# Patient Record
Sex: FEMALE | Race: OTHER | URBAN - METROPOLITAN AREA
[De-identification: names, ages, dates, MRNs, and addresses within clinical notes are randomized per-mention and may not be internally consistent; named-entity substitution may affect disease eponyms.]

---

## 2021-03-12 ENCOUNTER — OFFICE VISIT (OUTPATIENT)
Dept: INTERNAL MEDICINE CLINIC | Facility: CLINIC | Age: 21
End: 2021-03-12
Payer: COMMERCIAL

## 2021-03-12 ENCOUNTER — LAB ENCOUNTER (OUTPATIENT)
Dept: LAB | Age: 21
End: 2021-03-12
Attending: INTERNAL MEDICINE
Payer: COMMERCIAL

## 2021-03-12 VITALS
BODY MASS INDEX: 26.65 KG/M2 | SYSTOLIC BLOOD PRESSURE: 110 MMHG | TEMPERATURE: 98 F | DIASTOLIC BLOOD PRESSURE: 71 MMHG | HEIGHT: 61.5 IN | HEART RATE: 82 BPM | RESPIRATION RATE: 16 BRPM | WEIGHT: 143 LBS

## 2021-03-12 DIAGNOSIS — Z00.00 ANNUAL PHYSICAL EXAM: ICD-10-CM

## 2021-03-12 DIAGNOSIS — Z01.419 ENCOUNTER FOR ANNUAL ROUTINE GYNECOLOGICAL EXAMINATION: ICD-10-CM

## 2021-03-12 DIAGNOSIS — Z00.00 ANNUAL PHYSICAL EXAM: Primary | ICD-10-CM

## 2021-03-12 DIAGNOSIS — L05.91 PILONIDAL CYST: ICD-10-CM

## 2021-03-12 LAB
ALBUMIN SERPL-MCNC: 4.2 G/DL (ref 3.4–5)
ALBUMIN/GLOB SERPL: 1.4 {RATIO} (ref 1–2)
ALP LIVER SERPL-CCNC: 59 U/L
ALT SERPL-CCNC: 19 U/L
ANION GAP SERPL CALC-SCNC: 5 MMOL/L (ref 0–18)
AST SERPL-CCNC: 10 U/L (ref 15–37)
BASOPHILS # BLD AUTO: 0.03 X10(3) UL (ref 0–0.2)
BASOPHILS NFR BLD AUTO: 0.5 %
BILIRUB SERPL-MCNC: 0.3 MG/DL (ref 0.1–2)
BUN BLD-MCNC: 15 MG/DL (ref 7–18)
BUN/CREAT SERPL: 22.4 (ref 10–20)
CALCIUM BLD-MCNC: 8.7 MG/DL (ref 8.5–10.1)
CHLORIDE SERPL-SCNC: 107 MMOL/L (ref 98–112)
CHOLEST SMN-MCNC: 169 MG/DL (ref ?–200)
CO2 SERPL-SCNC: 28 MMOL/L (ref 21–32)
CREAT BLD-MCNC: 0.67 MG/DL
DEPRECATED RDW RBC AUTO: 47.3 FL (ref 35.1–46.3)
EOSINOPHIL # BLD AUTO: 0.04 X10(3) UL (ref 0–0.7)
EOSINOPHIL NFR BLD AUTO: 0.7 %
ERYTHROCYTE [DISTWIDTH] IN BLOOD BY AUTOMATED COUNT: 15 % (ref 11–15)
EST. AVERAGE GLUCOSE BLD GHB EST-MCNC: 103 MG/DL (ref 68–126)
GLOBULIN PLAS-MCNC: 3.1 G/DL (ref 2.8–4.4)
GLUCOSE BLD-MCNC: 72 MG/DL (ref 70–99)
HBA1C MFR BLD HPLC: 5.2 % (ref ?–5.7)
HCT VFR BLD AUTO: 34.1 %
HDLC SERPL-MCNC: 70 MG/DL (ref 40–59)
HGB BLD-MCNC: 10.5 G/DL
IMM GRANULOCYTES # BLD AUTO: 0.02 X10(3) UL (ref 0–1)
IMM GRANULOCYTES NFR BLD: 0.3 %
LDLC SERPL CALC-MCNC: 85 MG/DL (ref ?–100)
LYMPHOCYTES # BLD AUTO: 1.29 X10(3) UL (ref 1–4)
LYMPHOCYTES NFR BLD AUTO: 21 %
M PROTEIN MFR SERPL ELPH: 7.3 G/DL (ref 6.4–8.2)
MCH RBC QN AUTO: 26.6 PG (ref 26–34)
MCHC RBC AUTO-ENTMCNC: 30.8 G/DL (ref 31–37)
MCV RBC AUTO: 86.5 FL
MONOCYTES # BLD AUTO: 0.26 X10(3) UL (ref 0.1–1)
MONOCYTES NFR BLD AUTO: 4.2 %
NEUTROPHILS # BLD AUTO: 4.51 X10 (3) UL (ref 1.5–7.7)
NEUTROPHILS # BLD AUTO: 4.51 X10(3) UL (ref 1.5–7.7)
NEUTROPHILS NFR BLD AUTO: 73.3 %
NONHDLC SERPL-MCNC: 99 MG/DL (ref ?–130)
OSMOLALITY SERPL CALC.SUM OF ELEC: 289 MOSM/KG (ref 275–295)
PATIENT FASTING Y/N/NP: YES
PATIENT FASTING Y/N/NP: YES
PLATELET # BLD AUTO: 346 10(3)UL (ref 150–450)
POTASSIUM SERPL-SCNC: 4.4 MMOL/L (ref 3.5–5.1)
RBC # BLD AUTO: 3.94 X10(6)UL
SODIUM SERPL-SCNC: 140 MMOL/L (ref 136–145)
T4 FREE SERPL-MCNC: 1 NG/DL (ref 0.8–1.7)
TRIGL SERPL-MCNC: 72 MG/DL (ref 30–149)
TSI SER-ACNC: 0.86 MIU/ML (ref 0.36–3.74)
VLDLC SERPL CALC-MCNC: 14 MG/DL (ref 0–30)
WBC # BLD AUTO: 6.2 X10(3) UL (ref 4–11)

## 2021-03-12 PROCEDURE — 3078F DIAST BP <80 MM HG: CPT | Performed by: INTERNAL MEDICINE

## 2021-03-12 PROCEDURE — 86780 TREPONEMA PALLIDUM: CPT

## 2021-03-12 PROCEDURE — 84439 ASSAY OF FREE THYROXINE: CPT

## 2021-03-12 PROCEDURE — 3074F SYST BP LT 130 MM HG: CPT | Performed by: INTERNAL MEDICINE

## 2021-03-12 PROCEDURE — 87389 HIV-1 AG W/HIV-1&-2 AB AG IA: CPT

## 2021-03-12 PROCEDURE — 83036 HEMOGLOBIN GLYCOSYLATED A1C: CPT | Performed by: INTERNAL MEDICINE

## 2021-03-12 PROCEDURE — 80053 COMPREHEN METABOLIC PANEL: CPT

## 2021-03-12 PROCEDURE — 3008F BODY MASS INDEX DOCD: CPT | Performed by: INTERNAL MEDICINE

## 2021-03-12 PROCEDURE — 99385 PREV VISIT NEW AGE 18-39: CPT | Performed by: INTERNAL MEDICINE

## 2021-03-12 PROCEDURE — 82306 VITAMIN D 25 HYDROXY: CPT

## 2021-03-12 PROCEDURE — 85025 COMPLETE CBC W/AUTO DIFF WBC: CPT

## 2021-03-12 PROCEDURE — 84443 ASSAY THYROID STIM HORMONE: CPT

## 2021-03-12 PROCEDURE — 80061 LIPID PANEL: CPT

## 2021-03-12 PROCEDURE — 36415 COLL VENOUS BLD VENIPUNCTURE: CPT

## 2021-03-12 RX ORDER — MULTIVIT-MIN/IRON FUM/FOLIC AC 7.5 MG-4
1 TABLET ORAL DAILY
COMMUNITY

## 2021-03-12 NOTE — PROGRESS NOTES
HPI:    Patient ID: Jenn Garza is a 22year old female. HPIpatient is new and here for a physical today . Her only complaint is that she has a draining area at the base of her spine where her buttocks begin. It is painful when it is inflamed.    No Appearance: She is well-developed. Eyes:      Pupils: Pupils are equal, round, and reactive to light. Neck:      Thyroid: No thyromegaly. Cardiovascular:      Rate and Rhythm: Normal rate and regular rhythm. Heart sounds: Normal heart sounds. #[id number]

## 2021-03-12 NOTE — ASSESSMENT & PLAN NOTE
Currently no sign of infection but she has had recurrent drainage from this area from time to time.  See Dr Fernanda Esteves

## 2021-03-15 LAB
25(OH)D3 SERPL-MCNC: 39.8 NG/ML (ref 30–100)
T PALLIDUM AB SER QL: NEGATIVE

## 2023-07-26 ENCOUNTER — OFFICE VISIT (OUTPATIENT)
Dept: SURGERY | Facility: CLINIC | Age: 23
End: 2023-07-26

## 2023-07-26 DIAGNOSIS — L05.91 PILONIDAL CYST: Primary | ICD-10-CM

## 2023-07-26 PROCEDURE — 99203 OFFICE O/P NEW LOW 30 MIN: CPT | Performed by: SURGERY

## 2023-07-26 NOTE — H&P
History and Physical      HPI   Patient presents with:  Consult: Pt. Presents today to discuss recurrent  pilonidal cyst.       HPI  Mignon Bhakta is a 26year old female who presents with pilonidal infections. She had it lanced 6 years ago. It continues to return and will open and drain on its own. She desires more definitive treatment. History reviewed. No pertinent past medical history. History reviewed. No pertinent surgical history. Current Outpatient Medications   Medication Sig Dispense Refill    Multiple Vitamins-Minerals (MULTI-VITAMIN/MINERALS) Oral Tab Take 1 tablet by mouth daily. ALLERGIES  No Known Allergies    Social History    Socioeconomic History      Marital status: Single    Occupational History      Occupation: [employer] student     Tobacco Use      Smoking status: Never      Smokeless tobacco: Never    Family History   Problem Relation Age of Onset    Hypertension Father     Anemia Mother     Hypertension Maternal Grandmother        Review of Systems   A comprehensive 10 point review of systems was completed. Pertinent positives and negatives noted in the the HPI. PHYSICAL EXAM   LMP 07/15/2023 (Approximate)  Patient's last menstrual period was 07/15/2023 (approximate).    Constitutional: appears well hydrated alert and responsive no acute distress noted  Head/Face: normocephalic  Nose/Mouth/Throat: nose and throat are clear palate is intact mucous membranes are moist no oral lesions are noted  Neck/Thyroid: neck is supple without adenopathy  Respiratory: normal to inspection lungs are clear to auscultation bilaterally normal respiratory effort  Cardiovascular: regular rate and rhythm no murmurs, gallups, or rubs  Abdomen: soft non-tender non-distended no organomegaly noted no masses    Pilonidal disease at the anococcygeal cleft  Extremities: no edema, cyanosis, or clubbing  Neurological: exam appropriate for age reflexes and motor skills appropriate for age      ASSESSMENT/PLAN Assessment   Pilonidal cyst  (primary encounter diagnosis)    26year old female with pilonidal disease, chronic  We have discussed the surgical risks, benefits, alternatives, and expected recovery. We will plan excision and closure at [place] [address]. All of the patient's questions have been answered to her satisfaction.        7/26/2023  Temitope Womack MD

## 2023-07-26 NOTE — PATIENT INSTRUCTIONS
Obtain your preoperative testing.   Plan excision pilonidal with closure at [facility] same-day surgery    Avoid aspirin and NSAIDs for 5 days prior to surgery

## 2023-08-04 NOTE — TELEPHONE ENCOUNTER
Patient calling to schedule surgery, requesting to speak with Maria Isabel,please call at [phone].

## 2023-08-15 NOTE — TELEPHONE ENCOUNTER
PC from [facility] regarding date of surgery. I called patient and confimred surgery is now RS to 9-7-23. Surgical case change done.       Frances Avina

## 2023-09-05 NOTE — TELEPHONE ENCOUNTER
Per pt she is scheduled for surgery 9/7 and has some questions and concerned that she may not be able to have the surgery.  Please advise

## 2023-09-07 NOTE — INTERVAL H&P NOTE
Pre-op Diagnosis: Pilonidal cyst [L05.91]    The above referenced H&P was reviewed by Radha Shah MD on 9/7/2023, the patient was examined and no significant changes have occurred in the patient's condition since the H&P was performed. I discussed with the patient and/or legal representative the potential benefits, risks and side effects of this procedure; the likelihood of the patient achieving goals; and potential problems that might occur during recuperation. I discussed reasonable alternatives to the procedure, including risks, benefits and side effects related to the alternatives and risks related to not receiving this procedure. We will proceed with procedure as planned.

## 2023-09-07 NOTE — OR PREOP
Surgery canceled per Dr Shay Serrato and Dr. Camden Oneill. Instructed patient to call office to reschedule. Patient had postive pregnancy test x2. Patient states she had  pill . Currently bleeding,  wearing tampon. She does NOT want mom to know. Do not share any information with mother when rescheduling per patient.

## 2023-09-07 NOTE — H&P
History and Physical      HPI   Patient presents with:  Consult: Pt. Presents today to discuss recurrent  pilonidal cyst.       HPI  Jaquelin Chavarria is a 26year old female who presents with pilonidal infections. She had it lanced 6 years ago. It continues to return and will open and drain on its own. She desires more definitive treatment. History reviewed. No pertinent past medical history. History reviewed. No pertinent surgical history. Current Outpatient Medications   Medication Sig Dispense Refill    Multiple Vitamins-Minerals (MULTI-VITAMIN/MINERALS) Oral Tab Take 1 tablet by mouth daily. ALLERGIES  No Known Allergies    Social History    Socioeconomic History      Marital status: Single    Occupational History      Occupation: [employer] student     Tobacco Use      Smoking status: Never      Smokeless tobacco: Never    Family History   Problem Relation Age of Onset    Hypertension Father     Anemia Mother     Hypertension Maternal Grandmother        Review of Systems   A comprehensive 10 point review of systems was completed. Pertinent positives and negatives noted in the the HPI. PHYSICAL EXAM   LMP 07/15/2023 (Approximate)  Patient's last menstrual period was 07/15/2023 (approximate).    Constitutional: appears well hydrated alert and responsive no acute distress noted  Head/Face: normocephalic  Nose/Mouth/Throat: nose and throat are clear palate is intact mucous membranes are moist no oral lesions are noted  Neck/Thyroid: neck is supple without adenopathy  Respiratory: normal to inspection lungs are clear to auscultation bilaterally normal respiratory effort  Cardiovascular: regular rate and rhythm no murmurs, gallups, or rubs  Abdomen: soft non-tender non-distended no organomegaly noted no masses    Pilonidal disease at the anococcygeal cleft  Extremities: no edema, cyanosis, or clubbing  Neurological: exam appropriate for age reflexes and motor skills appropriate for age      ASSESSMENT/PLAN Assessment   Pilonidal cyst  (primary encounter diagnosis)    26year old female with pilonidal disease, chronic  We have discussed the surgical risks, benefits, alternatives, and expected recovery. We will plan excision and closure at [facility]. All of the patient's questions have been answered to her satisfaction.        9/7/2023  Phyllis Verdin MD

## 2023-09-07 NOTE — H&P (VIEW-ONLY)
History and Physical      HPI   Patient presents with:  Consult: Pt. Presents today to discuss recurrent  pilonidal cyst.       HPI  Patsy Gibson is a 26year old female who presents with pilonidal infections. She had it lanced 6 years ago. It continues to return and will open and drain on its own. She desires more definitive treatment. History reviewed. No pertinent past medical history. History reviewed. No pertinent surgical history. Current Outpatient Medications   Medication Sig Dispense Refill    Multiple Vitamins-Minerals (MULTI-VITAMIN/MINERALS) Oral Tab Take 1 tablet by mouth daily. ALLERGIES  No Known Allergies    Social History    Socioeconomic History      Marital status: Single    Occupational History      Occupation: [employer] student     Tobacco Use      Smoking status: Never      Smokeless tobacco: Never    Family History   Problem Relation Age of Onset    Hypertension Father     Anemia Mother     Hypertension Maternal Grandmother        Review of Systems   A comprehensive 10 point review of systems was completed. Pertinent positives and negatives noted in the the HPI. PHYSICAL EXAM   LMP 07/15/2023 (Approximate)  Patient's last menstrual period was 07/15/2023 (approximate).    Constitutional: appears well hydrated alert and responsive no acute distress noted  Head/Face: normocephalic  Nose/Mouth/Throat: nose and throat are clear palate is intact mucous membranes are moist no oral lesions are noted  Neck/Thyroid: neck is supple without adenopathy  Respiratory: normal to inspection lungs are clear to auscultation bilaterally normal respiratory effort  Cardiovascular: regular rate and rhythm no murmurs, gallups, or rubs  Abdomen: soft non-tender non-distended no organomegaly noted no masses    Pilonidal disease at the anococcygeal cleft  Extremities: no edema, cyanosis, or clubbing  Neurological: exam appropriate for age reflexes and motor skills appropriate for age      ASSESSMENT/PLAN Assessment   Pilonidal cyst  (primary encounter diagnosis)    26year old female with pilonidal disease, chronic  We have discussed the surgical risks, benefits, alternatives, and expected recovery. We will plan excision and closure at [place] [id number]. All of the patient's questions have been answered to her satisfaction.        9/7/2023  Wiliam Jansen MD

## 2023-10-05 NOTE — OPERATIVE REPORT
[facility]    PATIENT'S NAME: LESVIA Zapien   ATTENDING PHYSICIAN: Marc Mendoza MD   OPERATING PHYSICIAN: Marc Mendoza MD   PATIENT ACCOUNT#:   [de-identified]    LOCATION:  [address]  MEDICAL RECORD #:   [id number]       YOB: 2000  ADMISSION DATE:       10/05/2023      OPERATION DATE:  10/05/2023    OPERATIVE REPORT      PREOPERATIVE DIAGNOSIS:  Pilonidal disease. POSTOPERATIVE DIAGNOSIS:  Pilonidal disease. PROCEDURE:  Wide excision of pilonidal disease with complex Karydakis flap closure. ASSISTANT:  JACQUE Staples     ESTIMATED BLOOD LOSS:  5 mL. COMPLICATIONS:  None. ANESTHESIA:  General.     DISPOSITION:  To Recovery, tolerated well. INDICATIONS:  The patient is a pleasant 43-year-old with the above complaint. Consent obtained. OPERATIVE TECHNIQUE:  She was taken surgery, placed in the oswaldo-knife prone position. She was prepped and draped in usual sterile fashion. Marcaine 0.25% with epinephrine is infiltrated for local block. Elliptical incision is made measuring 5 cm including all diseased tissue. This is carried down to the sacral fascia. Wound is irrigated. More Marcaine is infiltrated. Karydakis flap is created using electrocautery and closed in layers using 2-0 Monocryl, 3-0 Monocryl, and Dermabond. She tolerated this well. Sterile dressing applied. Dictated By Marc Mendoza MD  d: 10/05/2023 13:51:28  t: 10/05/2023 16:06:51  Job [id number]  ZT/    cc: Jaky Garcia MD

## 2023-10-05 NOTE — INTERVAL H&P NOTE
Pre-op Diagnosis: Pilonidal cyst [L05.91]    The above referenced H&P was reviewed by Libra Garcia MD on 10/5/2023, the patient was examined and no significant changes have occurred in the patient's condition since the H&P was performed. I discussed with the patient and/or legal representative the potential benefits, risks and side effects of this procedure; the likelihood of the patient achieving goals; and potential problems that might occur during recuperation. I discussed reasonable alternatives to the procedure, including risks, benefits and side effects related to the alternatives and risks related to not receiving this procedure. We will proceed with procedure as planned.

## 2023-10-05 NOTE — ANESTHESIA PROCEDURE NOTES
Airway  Date/Time: 10/5/2023 1:30 PM  Urgency: elective    Airway not difficult    General Information and Staff    Patient location during procedure: OR  Anesthesiologist: Darryl Shah MD  Performed: anesthesiologist   Performed by: Darryl Shah MD  Authorized by: Darryl Shah MD      Indications and Patient Condition  Indications for airway management: anesthesia  Spontaneous Ventilation: absent  Sedation level: deep  Preoxygenated: yes  Patient position: sniffing  Mask difficulty assessment: 1 - vent by mask    Final Airway Details  Final airway type: endotracheal airway      Successful airway: ETT  Cuffed: yes   Successful intubation technique: direct laryngoscopy  Facilitating devices/methods: intubating stylet and cricoid pressure  Endotracheal tube insertion site: oral  Blade size: #3  ETT size (mm): 7.0    Cormack-Lehane Classification: grade IIA - partial view of glottis  Placement verified by: capnometry   Cuff volume (mL): 6  Measured from: lips  ETT to lips (cm): 21  Number of attempts at approach: 1  Number of other approaches attempted: 0

## 2023-10-18 NOTE — PROGRESS NOTES
S:  Micaela Villarreal is a 26year old female sp wide excision of pilonidal disease by Dr. Melyssa Ramon. Doing well, no fevers, no chills, tolerating a general diet, generally normal bowel movements, no calf tenderness nor lower extremity edema, no shortness of breath, no chest pain. O:  LMP 09/25/2023 (Approximate)   GEN:  No acute distress  L: nonlabored respirations  H: reg rate  Abd:  Soft, NT,ND. Skin: Incision C D I, no eryth. Incision healing, well approximated. Extr: No edema, no calf tenderness    Path:  Reviewed w pt    Assessment   Encounter for postoperative care  (primary encounter diagnosis)    Doing well post op  Continue to keep incision clean and dry. Maintain a healthy diet. Maintain good hydration. F/u prn.          Semaj Ray PA-C

## 2023-10-31 NOTE — TELEPHONE ENCOUNTER
Patient stating she recently had a surgery and would like to know if she can go in a roller coaster. Please advise

## 2023-11-02 NOTE — TELEPHONE ENCOUNTER
Per Dr. Ning Heredia, Yes she can do [employer] and yes to the roller coaster. I called patient back and she expressed understanding.      Mike Roach

## 2023-11-02 NOTE — TELEPHONE ENCOUNTER
11-2-2023. Patient has Pilonidal cyst excision 10-5-23. Patient denies any pain, discharge bleeding. Incision is still healing and reports some glue still present. All is well. Patient going to [facility] with a group Nov 17 th and has planned to be in [facility]" Just wants to make sure this is okay.      Graciela Ochoa

## 2024-07-26 NOTE — H&P
HPI:    Patient ID: Tish Donis is a 24 year old female.    HPIpatient had pilonidal cyst surgery Oct 2023 , she has been doing well, working now as a CNA but yesterday she developed rectal bleeding after pushing and straining with a firm bowel movement .  No pain , no bleeding today .   No chest pain , no shortness of breath , no swelling, no headaches, no visual changes.     No past medical history on file.    Past Surgical History:   Procedure Laterality Date    Remv pilonidal lesion complic  10/05/2023    wide excision of pilonidal disease with complex Karydakis flap closure.       Social History     Tobacco Use    Smoking status: Never    Smokeless tobacco: Never   Substance Use Topics    Alcohol use: Yes     Alcohol/week: 4.0 standard drinks of alcohol     Types: 4 Standard drinks or equivalent per week     Comment: OCCASIONAL       Family History   Problem Relation Age of Onset    Hypertension Father     Anemia Mother     Hypertension Maternal Grandmother     No Known Problems Sister     No Known Problems Sister     No Known Problems Brother          ROS:   Review of Systems   Constitutional:  Negative for activity change, appetite change, chills, fatigue and fever.   HENT:  Negative for ear pain, hearing loss, nosebleeds, postnasal drip, rhinorrhea, sneezing and tinnitus.    Eyes:  Negative for pain, discharge, redness, itching and visual disturbance.   Respiratory:  Negative for apnea, cough, chest tightness, shortness of breath and wheezing.    Cardiovascular:  Negative for chest pain, palpitations and leg swelling.   Gastrointestinal:  Positive for anal bleeding. Negative for abdominal distention, blood in stool, constipation, diarrhea and nausea.   Endocrine: Negative for cold intolerance and heat intolerance.   Genitourinary:  Negative for difficulty urinating, dysuria, flank pain, frequency, genital sores, hematuria and urgency.   Musculoskeletal:  Negative for neck pain and neck stiffness.    Allergic/Immunologic: Negative for immunocompromised state.   Neurological:  Negative for dizziness, syncope, facial asymmetry, weakness, light-headedness, numbness and headaches.            Current Outpatient Medications   Medication Sig Dispense Refill    hydrocortisone (PROCTOSOL HC) 2.5 % External Cream Apply bid 28 g 0    Multiple Vitamins-Minerals (MULTI-VITAMIN/MINERALS) Oral Tab Take 1 tablet by mouth daily.      HYDROcodone-acetaminophen 5-325 MG Oral Tab Take 1 tablet by mouth every 6 (six) hours as needed. (Patient not taking: Reported on 7/26/2024) 10 tablet 0     Allergies:No Known Allergies   PHYSICAL EXAM:   /70   Pulse 103   Temp 98.1 °F (36.7 °C) (Oral)   Resp 18   Ht 5' 2\" (1.575 m)   Wt 146 lb 6.4 oz (66.4 kg)   LMP 07/01/2024 (Approximate)   BMI 26.78 kg/m²     Physical Exam  Constitutional:       Appearance: She is well-developed.   HENT:      Right Ear: Tympanic membrane normal.      Left Ear: Tympanic membrane normal.      Mouth/Throat:      Mouth: Mucous membranes are moist.      Pharynx: Oropharynx is clear.   Eyes:      Pupils: Pupils are equal, round, and reactive to light.   Neck:      Thyroid: No thyromegaly.   Cardiovascular:      Rate and Rhythm: Normal rate and regular rhythm.      Heart sounds: Normal heart sounds. No murmur heard.     No gallop.   Pulmonary:      Effort: Pulmonary effort is normal. No respiratory distress.      Breath sounds: Normal breath sounds. No wheezing or rales.   Chest:      Chest wall: No tenderness.   Abdominal:      General: There is no distension.      Palpations: There is no mass.      Tenderness: There is no abdominal tenderness. There is no guarding or rebound.   Genitourinary:     Comments: One hemorrhoid.   Musculoskeletal:         General: No tenderness.      Cervical back: Normal range of motion.   Skin:     General: Skin is warm.      Coloration: Skin is not pale.      Findings: No erythema or rash.   Neurological:      Mental  Status: She is alert and oriented to person, place, and time.      Motor: No abnormal muscle tone.      Coordination: Coordination normal.   Psychiatric:         Mood and Affect: Mood normal.         Behavior: Behavior normal.         Thought Content: Thought content normal.         Judgment: Judgment normal.                ASSESSMENT/PLAN:   Grade II hemorrhoids  Increase fiber , increase fluids , proctosol.     Encounter for annual routine gynecological examination  See gyne.     Annual physical exam  Physical today   Labs today .     Orders Placed This Encounter   Procedures    CBC With Differential With Platelet    Comp Metabolic Panel (14)    Hemoglobin A1C    Lipid Panel    Assay, Thyroid Stim Hormone    Free T4, (Free Thyroxine)       Meds This Visit:  Requested Prescriptions     Signed Prescriptions Disp Refills    hydrocortisone (PROCTOSOL HC) 2.5 % External Cream 28 g 0     Sig: Apply bid       Imaging & Referrals:  [facility] - INTERNAL       ID#[id number]   Normal for race

## 2024-07-30 NOTE — TELEPHONE ENCOUNTER
Dr. Saucedo- patient is calling back in regards to her test results:    Her periods are not heavy- not bleeding through pads/tampons  Menstrual Cycle is normal- every 28 days  Will get iron supplement and start  Would you like any repeat testing?    Thank you        Glucose is 73 , liver and kidney functions are normal, chol is 183 hdl is 71 ldl is 84 all at goal , wbc is normal, hgb is low 9.1 , last was 10.5 , normal is 12. 0 or above. Platelets are elevated , due to anemia. Thyroid functions are normal. Please call patient , Are periods heavy , or frequent? ( She has an appointment with gyne in Sept).   She should be taking iron , ferrous sulfate 325 mg , one daily over the counter.   Written by Luis Saucedo MD on 7/29/2024  6:47 PM CDT  Seen by patient Tish Donis on 7/30/2024  9:22 AM

## 2024-09-09 NOTE — PROGRESS NOTES
ANNUAL GYN EXAM  [facility]    CHIEF COMPLAINT:    Chief Complaint   Patient presents with    Annual     pap      HISTORY OF PRESENT ILLNESS:   Tish Donis is a 24 year old female   who presents for annual well woman visit.  She is feeling well without complaints.    Partner in     PAST GYNECOLOGICAL HISTORY & OTHER PREVENTIVE MEDICINE  LMP: Patient's last menstrual period was 2024 (approximate).  Menarche: 15 yeras old (2024 10:34 AM)  Period Cycle (Days): monthly (2024 10:34 AM)  Period Duration (Days): 5-6 days (2024 10:34 AM)  Period Flow: moderate (2024 10:34 AM)  Use of Birth Control (if yes, specify type): None (2024 10:34 AM)  Pap Result Notes: no pap hx (2024 10:34 AM)    Complications: none  Gravita/Parity:   Contraception: current -none; Previous - none  Sexually transmitted disease history:None  Number of sexual partners: current sexual partners: 1, 5 yrs, Lifetime partners: 2  Pap history: never had Last pap/result:  ; history abnormals:   Abuse history: denies  Vaginal discharge: denies  Bladder symptoms: denies    PAST MEDICAL HISTORY:   History reviewed. No pertinent past medical history.     PAST SURGICAL HISTORY:   Past Surgical History:   Procedure Laterality Date    Remv pilonidal lesion complic  10/05/2023    wide excision of pilonidal disease with complex Karydakis flap closure.        PAST OB HISTORY:  OB History    Para Term  AB Living   1       1     SAB IAB Ectopic Multiple Live Births                  # Outcome Date GA Lbr Cosmo/2nd Weight Sex Type Anes PTL Lv   1 AB                CURRENT MEDICATIONS:      Current Outpatient Medications:     Multiple Vitamins-Minerals (MULTI-VITAMIN/MINERALS) Oral Tab, Take 1 tablet by mouth daily., Disp: , Rfl:     hydrocortisone (PROCTOSOL HC) 2.5 % External Cream, Apply bid (Patient not taking: Reported on 2024), Disp: 28 g, Rfl: 0    HYDROcodone-acetaminophen 5-325 MG  Oral Tab, Take 1 tablet by mouth every 6 (six) hours as needed. (Patient not taking: Reported on 7/26/2024), Disp: 10 tablet, Rfl: 0    ALLERGIES:  No Known Allergies    SOCIAL HISTORY:  Social History     Socioeconomic History    Marital status: Single   Occupational History    Occupation: biology student    Tobacco Use    Smoking status: Never    Smokeless tobacco: Never   Vaping Use    Vaping status: Never Used   Substance and Sexual Activity    Alcohol use: Yes     Alcohol/week: 4.0 standard drinks of alcohol     Types: 4 Standard drinks or equivalent per week     Comment: OCCASIONAL    Drug use: Never    Sexual activity: Yes     Partners: Male   Other Topics Concern    Blood Transfusions No       FAMILY HISTORY:  Family History   Problem Relation Age of Onset    Hypertension Father     Anemia Mother     Hypertension Maternal Grandmother     No Known Problems Sister     No Known Problems Sister     No Known Problems Brother      ASSESSMENTS:  REVIEW OF SYSTEMS:  CONSTITUTIONAL:  negative for fevers, chills and sweats    EYES:  negative for  blurred vision and visual disturbance  RESPIRATORY:  negative for  cough and shortness of breath  CARDIOVASCULAR:  negative for  chest pain, palpitations  GASTROINTESTINAL:  No constipation/diarrhea, no pain  GENITOURINARY:  See History of Present Illness  INTEGUMENT/BREAST: Breast: no masses, no nipple discharge  ENDOCRINE:  negative for acne, constipation, diarrhea, cold intolerance, heat intolerance, fatigue, hair loss, weight gain and weight loss  MUSCULOSKELETAL:  negative for joint pain  NEUROLOGICAL:  negative for dizziness/lightheadedness and headaches  BEHAVIOR/PSYCH:  Negative for depressed mood, anhedonia and anxiety    PHYSICAL EXAM  Patient's last menstrual period was 08/31/2024 (approximate).   Vitals:    09/09/24 1033   BP: 118/72   Weight: 144 lb 11.2 oz (65.6 kg)   Height: 62\"       CONSTITUTIONAL: Awake, alert, cooperative, no apparent distress, and appears  stated age   NECK:  symmetrical, trachea midline, no adenopathy, thyroid symmetric, not enlarged   LUNGS: respiration unlabored  CARDIOVASCULAR: no peripheral edema or varicosities, skin warm and dry  ABDOMEN: Soft, non-distended, non-tender, no masses palpated    CHEST/BREASTS: Breasts symmetrical, skin without lesion(s), no nipple retraction or dimpling, no nipple discharge, no masses palpated, no axillary or supraclavicular adenopathy  GENITAL/URINARY:    External Genitalia:  General appearance; normal, Hair distribution; normal, Lesions absent   Urethral Meatus:  Lesions absent, Prolapse absent  Bladder:  Tenderness absent, Cystocele absent  Vagina:  Discharge yellow with fishy odor, Lesions absent, Pelvic support normal  Cervix:  Lesions absent, Discharge absent, Tenderness absent  Uterus:  Size normal, Masses absent, Tenderness absent  Adnexa:  Masses absent, Tenderness absent  Anus/Perineum:  Lesions absent    MUSCULOSKELETAL: There is no redness, warmth, or swelling of the joints.  Full range of motion noted.  Motor strength is 5 out of 5 all extremities bilaterally.  Tone is normal.  NEUROLOGIC: Patient is awake, alert and oriented to name, place and time.  Casual gait is normal.  SKIN: no bruising or bleeding and no rashes  PSYCHIATRIC: Behavior:  Appropriate  Mood:  appropriate  ASSESSMENT AND PLAN:  1. Encounter for annual routine gynecological examination    - ThinPrep PAP with HPV Reflex Request  - Chlamydia/Gc Amplification    2. Screen for STD (sexually transmitted disease)  - HIV and trep sent as well    - Chlamydia/Gc Amplification    3. Vaginal discharge    - Vaginitis Vaginosis PCR Panel; Future       Preventive Medicine in a 24 year old female  Health Maintenance Topics with due status: Overdue       Topic Date Due    HPV Vaccines Never done    DTaP,Tdap,and Td Vaccines Never done    Pap Smear Never done    COVID-19 Vaccine Never done       COUNSELING/EDUCATION PERFORMED:   Contraception.  Form  chosen:  not currently active, none  method use review  Cervical Cancer Screening  Breast Cancer Screening - monthly self breast exam  Pre-zara counseling and use of Folic Acid  Appropriate diet  Exercise  Safe sex/STD transmission/use of condoms  follow up 1 yr or as needed  Kristin Graves MD

## 2024-09-23 NOTE — TELEPHONE ENCOUNTER
From: Tish Joseph  To: Kristin Graves  Sent: 9/20/2024 6:49 PM CDT  Subject: Medication    Good afternoon Dr. Graves, I’m sending this message wondering if there’s an alternative way to take my BV medication that is not pill form. I have a hard time swallowing pills and have tried splitting it in half but it dissolves too quickly before I try swallowing and the taste is just horrendous that I’m unable to take it crushed. Hope you can help, Thank you.     Tish Joseph   [phone]

## 2025-03-17 NOTE — PROGRESS NOTES
HPI:    Patient ID: Tish Donis is a 25 year old female.    Swelling  Pertinent negatives include no chest pain, chills, coughing, fatigue, fever, headaches, nausea, neck pain, numbness or weakness.     History of facial swelling and redness . Not itchy , no new makeup , no pets, not related to food that she knows of, started on Feb. 20th and has happened weekly . Benadryl helps, no shortness of breath , no throat tightness, no breathing compromise.   Review of Systems   Constitutional:  Negative for activity change, appetite change, chills, fatigue and fever.   HENT:  Negative for ear pain, hearing loss, nosebleeds, postnasal drip, rhinorrhea, sneezing and tinnitus.    Eyes:  Negative for pain, discharge, redness, itching and visual disturbance.   Respiratory:  Negative for apnea, cough, chest tightness, shortness of breath and wheezing.    Cardiovascular:  Negative for chest pain, palpitations and leg swelling.   Gastrointestinal:  Negative for abdominal distention, blood in stool, constipation, diarrhea and nausea.   Endocrine: Negative for cold intolerance and heat intolerance.   Genitourinary:  Negative for difficulty urinating, dysuria, flank pain, frequency, genital sores, hematuria and urgency.   Musculoskeletal:  Negative for neck pain and neck stiffness.   Allergic/Immunologic: Positive for environmental allergies. Negative for immunocompromised state.   Neurological:  Negative for dizziness, syncope, facial asymmetry, weakness, light-headedness, numbness and headaches.   Psychiatric/Behavioral: Negative.              Current Outpatient Medications   Medication Sig Dispense Refill    diphenhydrAMINE HCl (BENADRYL ALLERGY OR) Take by mouth.      Multiple Vitamins-Minerals (MULTI-VITAMIN/MINERALS) Oral Tab Take 1 tablet by mouth daily.       Allergies:Allergies[1]   PHYSICAL EXAM:   /58   Pulse 77   Temp 97.9 °F (36.6 °C)   Ht 5' 2\" (1.575 m)   Wt 147 lb (66.7 kg)   LMP 08/31/2024  (Approximate)   SpO2 99%   BMI 26.89 kg/m²      Physical Exam  Constitutional:       Appearance: She is well-developed.   Eyes:      Pupils: Pupils are equal, round, and reactive to light.   Neck:      Thyroid: No thyromegaly.   Cardiovascular:      Rate and Rhythm: Normal rate and regular rhythm.      Heart sounds: Normal heart sounds. No murmur heard.     No gallop.   Pulmonary:      Effort: Pulmonary effort is normal. No respiratory distress.      Breath sounds: Normal breath sounds. No wheezing or rales.   Chest:      Chest wall: No tenderness.   Abdominal:      General: There is no distension.      Palpations: There is no mass.      Tenderness: There is no abdominal tenderness. There is no guarding or rebound.   Musculoskeletal:         General: Swelling present. No tenderness.      Cervical back: Normal range of motion.   Skin:     General: Skin is warm.      Coloration: Skin is not pale.      Findings: No erythema or rash.   Neurological:      Mental Status: She is alert and oriented to person, place, and time.      Motor: No abnormal muscle tone.      Coordination: Coordination normal.   Psychiatric:         Behavior: Behavior normal.         Thought Content: Thought content normal.         Judgment: Judgment normal.                ASSESSMENT/PLAN:   Allergies  Allegra 180 mg daily   See allergist, referral placed.     No orders of the defined types were placed in this encounter.      Meds This Visit:  Requested Prescriptions      No prescriptions requested or ordered in this encounter       Imaging & Referrals:  [facility] - INTERNAL       ID#[id number]         [1] No Known Allergies

## 2025-05-05 NOTE — PROGRESS NOTES
The following individual(s) verbally consented to be recorded using ambient AI listening technology and understand that they can each withdraw their consent to this listening technology at any point by asking the clinician to turn off or pause the recording:    Patient name: Tish Sadiq Donis

## 2025-05-05 NOTE — PATIENT INSTRUCTIONS
Facial swelling  Facial swelling occurred upon waking, preceded by neck itching the previous evening. No associated lip or tongue swelling, and no respiratory issues. Symptoms resolved with Benadryl within an hour. No recurrence of severe symptoms since February. No identifiable triggers such as specific foods, medications, or environmental factors. Differential diagnosis includes angioedema, possibly idiopathic or related to environmental allergens. No evidence of food allergy. Skin testing planned to identify potential environmental allergens. Xyzal (levocetirizine) recommended for its superior 24-hour relief compared to Claritin, Allegra, or Zyrtec.  - Conduct skin testing for environmental allergens.  - Provide handouts on angioedema.  - Prescribe Xyzal (levocetirizine) 5 mg once daily, up to two to four times per day as needed if symptoms return.     Flu vaccine in the fall recommended  3.  COVID-vaccine booster in the fall recommended       Orders This Visit:  No orders of the defined types were placed in this encounter.      Meds This Visit:  Requested Prescriptions     Signed Prescriptions Disp Refills    levocetirizine 5 MG Oral Tab 90 tablet 1     Sig: Take 1 tablet (5 mg total) by mouth every evening.

## 2025-05-05 NOTE — PROGRESS NOTES
Tish Donis is a 25 year old female.    HPI:     Chief Complaint   Patient presents with    Allergies     Patient states had facial swelling, red and itchy in February 2025.     Patient is a 25-year-old female who presents for allergy consultation upon referral of her PCP, Dr. Luis Saucedo with a chief complaint of concern for allergies due to previous episodes of facial swelling prior note from visit with PCP from March 17, 2024 reviewed and appreciated including a prior episode of facial swelling.  Symptoms improved with Benadryl.  PCP recommended a trial of Allegra      Labs from 2024 including CBC CMP TSH reviewed.    Immunizations reviewed  No COVID-vaccine or flu vaccines on record      Today patient reports      History of Present Illness  Tish Donis is a 25 year old female who presents with facial swelling and itching. She was referred by Dr. Saucedo for evaluation of her facial swelling.    In February, she experienced sudden facial swelling and itching, waking up with a 'moon face' appearance after significant neck itching the night before. The swelling was confined to her face, with redness on her neck, and itching extended to her hands. She took Benadryl, which alleviated the itching and reduced the facial swelling within 24 hours. No associated symptoms such as lip or tongue swelling, breathing difficulties, or hives on the rest of her body were present. She did not seek emergency care and has not experienced a recurrence of the swelling to the same severity since then.    She denies any recent changes in medications, foods, or exposure to potential allergens prior to the episode. She has no known history of asthma, eczema, or food allergies. She occasionally experiences itchy eyes, particularly around springtime, suggesting possible seasonal allergic rhinitis. Her current medications include daily multivitamins, and she had previously used allergy medications like Benadryl but  stopped. She does not have any known drug allergies and has not been on any blood pressure medications.    She lives with two dogs and a fish, and her father is a smoker. She consumes alcohol and lives with her father, who smokes. No family history of allergies or asthma is reported.         HISTORY:  Past Medical History[1]   Past Surgical History[2]   Family History[3]   Social History: Short Social Hx on File[4]     Medications (Active prior to today's visit):  Current Medications[5]    Allergies:  Allergies[6]      ROS:     Allergic/Immuno:  See HPI  Cardiovascular:  Negative for irregular heartbeat/palpitations, chest pain, edema  Constitutional:  Negative night sweats,weight loss, irritability and lethargy  Endocrine:  Negative for cold intolerance, polydipsia and polyphagia  ENMT:  Negative for ear drainage, hearing loss and nasal drainage  Eyes:  Negative for eye discharge and vision loss  Gastrointestinal:  Negative for abdominal pain, diarrhea and vomiting  Genitourinary:  Negative for dysuria and hematuria  Hema/Lymph:  Negative for easy bleeding and easy bruising  Integumentary:  Negative for pruritus and rash  Musculoskeletal:  Negative for joint symptoms  Neurological:  Negative for dizziness, seizures  Psychiatric:  Negative for inappropriate interaction and psychiatric symptoms  Respiratory:  Negative for cough, dyspnea and wheezing      PHYSICAL EXAM:   Constitutional: responsive, no acute distress noted  Head/Face: NC/Atraumatic  Eyes/Vision: conjunctiva and lids are normal extraocular motion is intact   Ears/Audiometry: tympanic membranes are normal bilaterally hearing is grossly intact  Nose/Mouth/Throat: nose and throat are clear mucous membranes are moist   Neck/Thyroid: neck is supple without adenopathy  Lymphatic: no abnormal cervical, supraclavicular or axillary adenopathy is noted  Respiratory: normal to inspection lungs are clear to auscultation bilaterally normal respiratory effort    Cardiovascular: regular rate and rhythm no murmurs, gallups, or rubs  Abdomen: soft non-tender non-distended  Skin/Hair: no unusual rashes present  Extremities: no edema, cyanosis, or clubbing  Neurological:Oriented to time, place, person & situation       ASSESSMENT/PLAN:   Assessment   Encounter Diagnoses   Name Primary?    Facial swelling Yes    Environmental allergies     Flu vaccine need     Need for COVID-19 vaccine      Skin testing today to common indoor and outdoor environmental allergies was positive to dust mites  Positive histamine control     assessment plan     #1 facial swelling.  Facial swelling that awoke her in the next morning.  No specific foods or medications taking in the preceding 2 to 4 hours.  Patient was awoken at approximately 7 AM with the facial swelling.  No lip swelling tongue swelling or respiratory issues.  Symptoms was resolved with Benadryl within an hour of taking Benadryl.  No recurrence since then.  See above skin testing to screen for allergic triggers.  Handouts on angioedema provided and reviewed.  Patient can be posted if symptoms return.  If symptoms do return recommend a trial of Xyzal, levocetirizine 5 mg once a day up to 2-4 times per day if needed    2.  Flu vaccine in the fall recommended    3.  COVID-vaccine booster in the fall recommended     Assessment & Plan  Facial swelling  Facial swelling occurred upon waking, preceded by neck itching the previous evening. No associated lip or tongue swelling, and no respiratory issues. Symptoms resolved with Benadryl within an hour. No recurrence of severe symptoms since February. No identifiable triggers such as specific foods, medications, or environmental factors. Differential diagnosis includes angioedema, possibly idiopathic or related to environmental allergens. No evidence of food allergy. Skin testing planned to identify potential environmental allergens. Xyzal (levocetirizine) recommended for its superior 24-hour  relief compared to Claritin, Allegra, or Zyrtec.  - Conduct skin testing for environmental allergens.  - Provide handouts on angioedema.  - Prescribe Xyzal (levocetirizine) 5 mg once daily, up to two to four times per day as needed if symptoms return.     Flu vaccine in the fall recommended  3.  COVID-vaccine booster in the fall recommended       Orders This Visit:  No orders of the defined types were placed in this encounter.      Meds This Visit:  Requested Prescriptions     Signed Prescriptions Disp Refills    levocetirizine 5 MG Oral Tab 90 tablet 1     Sig: Take 1 tablet (5 mg total) by mouth every evening.       Imaging & Referrals:  None     5/5/2025  Neeraj Vazquez MD      If medication samples were provided today, they were provided solely for patient education and training related to self administration of these medications.  Teaching, instruction and sample was provided to the patient by myself.  Teaching included  a review of potential adverse side effects as well as potential efficacy.  Patient's questions were answered in regards to medication administration and dosing and potential side effects. Teaching was provided via the teach back method         [1] History reviewed. No pertinent past medical history.  [2]   Past Surgical History:  Procedure Laterality Date    Remv pilonidal lesion complic  10/05/2023    wide excision of pilonidal disease with complex Karydakis flap closure.   [3]   Family History  Problem Relation Age of Onset    Hypertension Father     Anemia Mother     Hypertension Maternal Grandmother     No Known Problems Sister     No Known Problems Sister     No Known Problems Brother    [4]   Social History  Socioeconomic History    Marital status: Single   Occupational History    Occupation: biology student    Tobacco Use    Smoking status: Never     Passive exposure: Current (father)    Smokeless tobacco: Never   Vaping Use    Vaping status: Never Used   Substance and Sexual Activity     Alcohol use: Yes     Alcohol/week: 4.0 standard drinks of alcohol     Types: 4 Standard drinks or equivalent per week     Comment: OCCASIONAL    Drug use: Never    Sexual activity: Yes     Partners: Male   Other Topics Concern    Blood Transfusions No   [5]   Current Outpatient Medications   Medication Sig Dispense Refill    levocetirizine 5 MG Oral Tab Take 1 tablet (5 mg total) by mouth every evening. 90 tablet 1    Multiple Vitamins-Minerals (MULTI-VITAMIN/MINERALS) Oral Tab Take 1 tablet by mouth daily.      diphenhydrAMINE HCl (BENADRYL ALLERGY OR) Take by mouth. (Patient not taking: Reported on 5/5/2025)     [6] No Known Allergies